# Patient Record
Sex: MALE | Race: BLACK OR AFRICAN AMERICAN
[De-identification: names, ages, dates, MRNs, and addresses within clinical notes are randomized per-mention and may not be internally consistent; named-entity substitution may affect disease eponyms.]

---

## 2019-04-10 ENCOUNTER — HOSPITAL ENCOUNTER (EMERGENCY)
Dept: HOSPITAL 61 - ER | Age: 24
Discharge: HOME | End: 2019-04-10
Payer: COMMERCIAL

## 2019-04-10 VITALS — HEIGHT: 76 IN | BODY MASS INDEX: 21.43 KG/M2 | WEIGHT: 176 LBS

## 2019-04-10 VITALS — SYSTOLIC BLOOD PRESSURE: 120 MMHG | DIASTOLIC BLOOD PRESSURE: 69 MMHG

## 2019-04-10 DIAGNOSIS — I10: ICD-10-CM
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
## 2019-04-10 DIAGNOSIS — S00.03XA: ICD-10-CM

## 2019-04-10 DIAGNOSIS — S06.0X1A: ICD-10-CM

## 2019-04-10 DIAGNOSIS — Z88.0: ICD-10-CM
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
## 2019-04-10 DIAGNOSIS — F17.200: ICD-10-CM

## 2019-04-10 DIAGNOSIS — S13.8XXA: Primary | ICD-10-CM
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
## 2019-04-10 PROCEDURE — 99284 EMERGENCY DEPT VISIT MOD MDM: CPT

## 2019-04-10 PROCEDURE — 72125 CT NECK SPINE W/O DYE: CPT

## 2019-04-10 PROCEDURE — 70450 CT HEAD/BRAIN W/O DYE: CPT
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
